# Patient Record
Sex: FEMALE | Race: WHITE | Employment: OTHER | URBAN - METROPOLITAN AREA
[De-identification: names, ages, dates, MRNs, and addresses within clinical notes are randomized per-mention and may not be internally consistent; named-entity substitution may affect disease eponyms.]

---

## 2019-03-21 ENCOUNTER — APPOINTMENT (OUTPATIENT)
Dept: GENERAL RADIOLOGY | Age: 74
End: 2019-03-21
Payer: MEDICARE

## 2019-03-21 ENCOUNTER — HOSPITAL ENCOUNTER (EMERGENCY)
Age: 74
Discharge: HOME | End: 2019-03-21
Payer: MEDICARE

## 2019-03-21 VITALS
WEIGHT: 186 LBS | TEMPERATURE: 97.9 F | HEIGHT: 66 IN | HEART RATE: 118 BPM | DIASTOLIC BLOOD PRESSURE: 132 MMHG | BODY MASS INDEX: 29.89 KG/M2 | OXYGEN SATURATION: 91 % | SYSTOLIC BLOOD PRESSURE: 181 MMHG

## 2019-03-21 DIAGNOSIS — M25.542 PAIN IN THUMB JOINT WITH MOVEMENT OF LEFT HAND: ICD-10-CM

## 2019-03-21 DIAGNOSIS — M25.532 WRIST PAIN, ACUTE, LEFT: ICD-10-CM
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
## 2019-03-21 PROCEDURE — 73110 X-RAY EXAM OF WRIST: CPT

## 2019-03-21 PROCEDURE — 4500000023 HC ED LEVEL 3 PROCEDURE

## 2019-03-21 PROCEDURE — 99283 EMERGENCY DEPT VISIT LOW MDM: CPT

## 2019-03-21 RX ORDER — NAPROXEN 500 MG/1
500 TABLET ORAL 2 TIMES DAILY
Qty: 60 TABLET | Refills: 0 | Status: SHIPPED | OUTPATIENT
Start: 2019-03-21 | End: 2019-03-21 | Stop reason: SDUPTHER

## 2019-03-21 RX ORDER — IBUPROFEN 800 MG/1
800 TABLET ORAL EVERY 8 HOURS PRN
Qty: 30 TABLET | Refills: 0 | Status: SHIPPED | OUTPATIENT
Start: 2019-03-21

## 2019-03-21 RX ORDER — NAPROXEN 500 MG/1
500 TABLET ORAL 2 TIMES DAILY
Qty: 20 TABLET | Refills: 0 | Status: SHIPPED | OUTPATIENT
Start: 2019-03-21

## 2019-03-21 ASSESSMENT — ENCOUNTER SYMPTOMS
VOMITING: 0
SHORTNESS OF BREATH: 0
WHEEZING: 0
DIARRHEA: 0
ABDOMINAL PAIN: 0
COLOR CHANGE: 0
NAUSEA: 0

## 2019-03-21 ASSESSMENT — PAIN SCALES - GENERAL: PAINLEVEL_OUTOF10: 9

## 2019-04-10 ENCOUNTER — OFFICE VISIT (OUTPATIENT)
Dept: ORTHOPEDIC SURGERY | Age: 74
End: 2019-04-10
Payer: MEDICARE

## 2019-04-10 VITALS
DIASTOLIC BLOOD PRESSURE: 79 MMHG | HEART RATE: 94 BPM | BODY MASS INDEX: 30.82 KG/M2 | RESPIRATION RATE: 16 BRPM | WEIGHT: 185 LBS | SYSTOLIC BLOOD PRESSURE: 134 MMHG | HEIGHT: 65 IN

## 2019-04-10 DIAGNOSIS — M19.032 LOCALIZED PRIMARY OSTEOARTHROSIS OF CARPOMETACARPAL JOINT OF LEFT WRIST: ICD-10-CM

## 2019-04-10 DIAGNOSIS — S52.592A OTHER CLOSED FRACTURE OF DISTAL END OF LEFT RADIUS, INITIAL ENCOUNTER: Primary | ICD-10-CM

## 2019-04-10 PROCEDURE — G8419 CALC BMI OUT NRM PARAM NOF/U: HCPCS | Performed by: ORTHOPAEDIC SURGERY

## 2019-04-10 PROCEDURE — G8428 CUR MEDS NOT DOCUMENT: HCPCS | Performed by: ORTHOPAEDIC SURGERY

## 2019-04-10 PROCEDURE — 1090F PRES/ABSN URINE INCON ASSESS: CPT | Performed by: ORTHOPAEDIC SURGERY

## 2019-04-10 PROCEDURE — 99203 OFFICE O/P NEW LOW 30 MIN: CPT | Performed by: ORTHOPAEDIC SURGERY

## 2019-04-10 PROCEDURE — 25600 CLTX DST RDL FX/EPHYS SEP WO: CPT | Performed by: ORTHOPAEDIC SURGERY

## 2019-04-10 PROCEDURE — L3908 WHO COCK-UP NONMOLDE PRE OTS: HCPCS | Performed by: ORTHOPAEDIC SURGERY

## 2019-04-10 PROCEDURE — 3017F COLORECTAL CA SCREEN DOC REV: CPT | Performed by: ORTHOPAEDIC SURGERY

## 2019-04-10 PROCEDURE — 4040F PNEUMOC VAC/ADMIN/RCVD: CPT | Performed by: ORTHOPAEDIC SURGERY

## 2019-04-10 PROCEDURE — 1123F ACP DISCUSS/DSCN MKR DOCD: CPT | Performed by: ORTHOPAEDIC SURGERY

## 2019-04-10 PROCEDURE — 1036F TOBACCO NON-USER: CPT | Performed by: ORTHOPAEDIC SURGERY

## 2019-04-10 PROCEDURE — G8400 PT W/DXA NO RESULTS DOC: HCPCS | Performed by: ORTHOPAEDIC SURGERY

## 2019-04-10 NOTE — PROGRESS NOTES
deformity or mal-alignment on the Left, normal on the Right. There is no evidence of gross joint instability, excluding the immediate zone of injury, bilaterally. Muscular strength is clinically appropriate bilaterally, limited by pain in the injured extremity. Examination of the left first Carpo-Metacarpal Joint of the wrist demonstrates moderate radial subluxation of the Thumb Metacarpal base upon the Trapezium. There is moderate pain with palpation at the [facility] joint line; pain is moderately worsened with Crank & Grind Maneuvers. There is not additional discomfort at the Bhnnjw-Kkbvzhqdi-Cnxtqfsqx joint. An adduction contracture of the first web space has developed with a compensatory hyper-extension deformity at the MP joint measuring 10 degrees. Examination for Stenosing Tenosynovitis demonstrates no evidence of tenderness, thickening or nodularity at the A-1 pulleys of the digits bilaterally. There no palpable triggering or any finger or thumb. Radiographic Evaluation:  Radiographs are reviewed  today (3 views of the left wrist). They demonstrate evidence of an healing fracture of the radial styloid with no comminution, non-displaced, no loss of radial lenght. The distal ulna, scaphoid and remaining carpus does not demonstrate evidence of a fracture. There is not evidence of other injury or bony fracture. Impression:  Ms. 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 Plan:      I have discussed with Ms. Peter Wren the various treatment options for treatment of left wrist fracture. We discussed the options of Closed treatment in situ, attempted closed reduction & cast immobilization, and Open Reduction & Internal Fixation of the fracture. she has elected to proceed conservativly, voicing an understanding of the other options available to her.   I have explained the complications, limitations, expectations, alternatives, & risks of her chosen treatment. We discussed the possibility of residual symptoms as may be related to closed treatment of fractures including the possiblities of: mal-union, non-union, delayed union, persistant deformity, persistant pain, limitation of motion, future arthritic symptoms & the possible need for further treatment. She understood our discussion and was comfortable with her decision; she was provided with appropriate expectations. I have discussed with Ms. Rigo Mendoza the conservative treatment options for treatment of left basilar joint arthritis including activity modification appropriate splint wear, and judicious use of anti-inflammatory medications and icing as needed. We also discussed the use of a supportive orthosis for symptom control. She is today fitted with a removable Short-arm thumb spica orhtosis. She was given a Patient Instruction Sheet related to cast care. As this patient has demonstrated risk-factors for Osteoporosis, and recent history of a fracture, I have referred her back to her Primary Care Physician for evaluation of Osteoporosis including possible consideration for DEXA Scan, if this is felt to be clinically indicated. Ms. Rigo Mendoza is instructed to contact her Primary Care Physician to discuss and arrange such evaluation. I have asked her to schedule a follow-up appointment for 4 weeks from now at which time we will obtain repeat radiographs of the wrist out of splint/cast.    She is specifically instructed to contact the office between now & her scheduled appointment if she has concerns related to the cast or the underlying fracture. She is welcome to call for an appointment sooner if she has any additional concerns or questions.

## 2019-04-10 NOTE — PATIENT INSTRUCTIONS
Instructions for Splint Use  Thumb Arthritis      1. Wear brace for ANY activity which is known to cause you pain. .    2.  You may wear brace during daytime hours as needed for specific activities which cause symptoms    3. You may wear the brace at night for sleep if you have pain during the night. 4.  Please do not wear brace all of the time as this will only make your wrist stiff and more uncomfortable. 5.  Brace does not need to be worn tightly, only secure enough to keep it from slipping or coming out of position. Brace may be Hand Washed Only. Make sure brace is completely dry prior to wearing.

## 2019-05-22 ENCOUNTER — OFFICE VISIT (OUTPATIENT)
Dept: ORTHOPEDIC SURGERY | Age: 74
End: 2019-05-22
Payer: MEDICARE

## 2019-05-22 VITALS — RESPIRATION RATE: 16 BRPM | BODY MASS INDEX: 30.82 KG/M2 | WEIGHT: 185 LBS | HEIGHT: 65 IN

## 2019-05-22 DIAGNOSIS — S52.592A OTHER CLOSED FRACTURE OF DISTAL END OF LEFT RADIUS, INITIAL ENCOUNTER: Primary | ICD-10-CM

## 2019-05-22 DIAGNOSIS — M19.032 LOCALIZED PRIMARY OSTEOARTHROSIS OF CARPOMETACARPAL JOINT OF LEFT WRIST: ICD-10-CM

## 2019-05-22 PROCEDURE — 1036F TOBACCO NON-USER: CPT | Performed by: ORTHOPAEDIC SURGERY

## 2019-05-22 PROCEDURE — 4040F PNEUMOC VAC/ADMIN/RCVD: CPT | Performed by: ORTHOPAEDIC SURGERY

## 2019-05-22 PROCEDURE — 99214 OFFICE O/P EST MOD 30 MIN: CPT | Performed by: ORTHOPAEDIC SURGERY

## 2019-05-22 PROCEDURE — G8427 DOCREV CUR MEDS BY ELIG CLIN: HCPCS | Performed by: ORTHOPAEDIC SURGERY

## 2019-05-22 PROCEDURE — G8400 PT W/DXA NO RESULTS DOC: HCPCS | Performed by: ORTHOPAEDIC SURGERY

## 2019-05-22 PROCEDURE — 3017F COLORECTAL CA SCREEN DOC REV: CPT | Performed by: ORTHOPAEDIC SURGERY

## 2019-05-22 PROCEDURE — 1090F PRES/ABSN URINE INCON ASSESS: CPT | Performed by: ORTHOPAEDIC SURGERY

## 2019-05-22 PROCEDURE — G8417 CALC BMI ABV UP PARAM F/U: HCPCS | Performed by: ORTHOPAEDIC SURGERY

## 2019-05-22 PROCEDURE — 1123F ACP DISCUSS/DSCN MKR DOCD: CPT | Performed by: ORTHOPAEDIC SURGERY

## 2019-05-22 NOTE — PROGRESS NOTES
Ms. Eduarda Rendon returns today in follow-up of her recent left Distal Radius Fracture which occurred approximately 8 weeks ago. Options for treatment of her fracture, such as closed reduction or surgical stabilization were discussed & considered during prior visits and were Not indicated. She has noted absent discomfort at the site of prior fracture and decreased swelling. She continues to  report moderate pain located about the base of the left thumbs at the level of the [facility] Joint, no tenderness of the remaining hand, wrist, or elbow on either side. She notes today, no neurologic symptoms in the hands. These Symptoms show no change over time. Physical Exam:  Skin (after immobilization is removed) is Skin color, texture, turgor normal. No rashes or lesions. Digits:  Active Range of Motion is well preserved limited only . by some mild osteoarthritis  . FPL function is intact, EPL function is intact  Thumb range of motion is limited and painful at the basilar joint  Left greater than Right   Wrist range of motion is mildly stiff on the left side, normal on the right side . Sensation is normal.  Vascular examination reveals normal and good capillary refill. Swelling is mild. no clinical evidence of residual skeletal deformity. Fracture site is not tender to palpation. There is not crepitance or gross motion at the previous fracture site. Radiographic Evaluation:  Radiographs were obtained today (3 views of the left wrist). They demonstrate excellent maintenance of alignment of the fracture fragments, moderate amount of interval healing of the fracture. The fracture does appear to be healed at this time. Her previously noted osteoarthritis of the left Thumb CMC joint remains unchanged    Impression:  Ms. Eduarda Rendon is doing well after recent left Distal Radius Fracture. It would appear that she has healed her fracture.   Her current symptoms appear related to her Thumb osteoarthritis Plan:  Ms. Nancy Ga is fitted with a removable forearm based protective orthosis. she is advised regarding the appropriate wear and use of this device. We discussed weaning from the splint for sedentary and protected activity. She is also instructed in  work on Active & Passive range of motion of the digits, wrist, & elbow. These modalities were demonstrated to her today. We discussed the continued restrictions on the use of the injured hand & wrist and the limitations on resumption of activities until full range of motion and comfort have been regained. We discussed the option of pursuing formalized hand therapy and a prescription  was offered and declined by the patient. As this patient has demonstrated risk-factors for Osteoporosis & has had a recent history of a fracture, I have referred her back to her Primary Care Physician for evaluation of Osteoporosis including possible consideration for DEXA Scan, if this is felt to be clinically indicated. Ms. Nancy Ga is instructed to contact her Primary Care Physician to discuss and arrange such evaluation. I have discussed with Ms. Nancy Ga the conservative treatment options for treatment of left basilar joint arthritis including activity modification appropriate splint wear, and judicious use of anti-inflammatory medications and icing as needed. We also discussed the use of a supportive orthosis for symptom control. An appropriately sized hand based neoprene orthosis already has. She understood our discussion and was provided with appropriate expectations. I have asked Ms. Nancy Ga to follow-up with me by either scheduling an appointment for 4 weeks from now or by calling me at that time if she has not been able to regain full painless range of motion and functional use of the injured extremity.       She is also specifically instructed to return to the office or call for an appointment sooner if her symptoms are changing or worsening prior to that time.

## 2020-09-02 NOTE — PATIENT INSTRUCTIONS
Thank you for choosing [facility]) Physicians for your Hand and Upper Extremity needs. If we can be of any further assistance to you, please do not hesitate to contact us.     Office Phone Number:  [phone]  or  [phone] sPOKE WITH CARLOS EDUARDO MCCOY IN [place]. SHE STATES ROBERT WILL BE GETTING THE PT BUT SHE IS IN THE  ROOM GIVING MEDS  AND WILL CALL ME BACK .